# Patient Record
Sex: MALE | Race: WHITE | NOT HISPANIC OR LATINO | Employment: STUDENT | URBAN - METROPOLITAN AREA
[De-identification: names, ages, dates, MRNs, and addresses within clinical notes are randomized per-mention and may not be internally consistent; named-entity substitution may affect disease eponyms.]

---

## 2019-03-05 ENCOUNTER — OFFICE VISIT (OUTPATIENT)
Dept: URGENT CARE | Age: 29
End: 2019-03-05
Payer: COMMERCIAL

## 2019-03-05 VITALS
OXYGEN SATURATION: 97 % | SYSTOLIC BLOOD PRESSURE: 140 MMHG | HEART RATE: 80 BPM | DIASTOLIC BLOOD PRESSURE: 87 MMHG | HEIGHT: 72 IN | WEIGHT: 225 LBS | TEMPERATURE: 98 F | BODY MASS INDEX: 30.48 KG/M2 | RESPIRATION RATE: 18 BRPM

## 2019-03-05 DIAGNOSIS — J02.9 SORE THROAT: ICD-10-CM

## 2019-03-05 DIAGNOSIS — J01.40 ACUTE NON-RECURRENT PANSINUSITIS: ICD-10-CM

## 2019-03-05 DIAGNOSIS — J06.9 ACUTE UPPER RESPIRATORY INFECTION: Primary | ICD-10-CM

## 2019-03-05 LAB — S PYO AG THROAT QL: NEGATIVE

## 2019-03-05 PROCEDURE — G0382 LEV 3 HOSP TYPE B ED VISIT: HCPCS | Performed by: FAMILY MEDICINE

## 2019-03-05 PROCEDURE — 87430 STREP A AG IA: CPT | Performed by: FAMILY MEDICINE

## 2019-03-05 RX ORDER — AMOXICILLIN 500 MG/1
500 CAPSULE ORAL EVERY 8 HOURS SCHEDULED
Qty: 30 CAPSULE | Refills: 0 | Status: SHIPPED | COMMUNITY
Start: 2019-03-05 | End: 2019-03-15

## 2019-03-05 NOTE — PATIENT INSTRUCTIONS
Amoxicillin 3 times a day until finished (please take probiotics)  Cold/cough/sinus medication as needed (try Flonase nasal spray 1 spray in each nostril once or twice a day)  Tylenol, or ibuprofen (Advil/Motrin) as needed  Gargle and swish mouth with warm salt water or mouthwash  Recheck/follow-up family physician as needed  Please go to the hospital emergency department if needed

## 2019-03-05 NOTE — PROGRESS NOTES
[facility]        NAME: Zain Mathew is a 33 y o  male  : 1990    MRN: [id number]  DATE: 2019  TIME: 9:55 AM    Assessment and Plan   Acute upper respiratory infection [J06 9]  1  Acute upper respiratory infection     2  Sore throat  POCT rapid strepA   3  Acute non-recurrent pansinusitis  amoxicillin (AMOXIL) 500 mg capsule         Patient Instructions     Patient Instructions   Amoxicillin 3 times a day until finished (please take probiotics)  Cold/cough/sinus medication as needed (try Flonase nasal spray 1 spray in each nostril once or twice a day)  Tylenol, or ibuprofen (Advil/Motrin) as needed  Gargle and swish mouth with warm salt water or mouthwash  Recheck/follow-up family physician as needed  Please go to the hospital emergency department if needed  Follow up with PCP in 3-5 days  Proceed to  ER if symptoms worsen  Chief Complaint     Chief Complaint   Patient presents with    Cold Like Symptoms     c/o sore throat, head congestion, headache , feverish x saturday   denies body aches, nause ,vomiting and diarrhea , with use of dayquil and nightquiil         History of Present Illness       Fever, congestion, sinus pressure, sore throat      Review of Systems   Review of Systems   Constitutional: Positive for fever  HENT: Positive for congestion, sinus pressure and sore throat  Respiratory: Negative  Cardiovascular: Negative  Musculoskeletal: Negative  Skin: Negative  Neurological: Negative            Current Medications       Current Outpatient Medications:     amoxicillin (AMOXIL) 500 mg capsule, Take 1 capsule (500 mg total) by mouth every 8 (eight) hours for 30 doses, Disp: 30 capsule, Rfl: 0    Current Allergies     Allergies as of 2019    (No Known Allergies)            The following portions of the patient's history were reviewed and updated as appropriate: allergies, current medications, past family history, past medical history, past social history, past surgical history and problem list      Past Medical History:   Diagnosis Date    [place] teeth extracted        Past Surgical History:   Procedure Laterality Date    DENTAL SURGERY         Family History   Problem Relation Age of Onset    Cancer Mother     Cancer Father          Medications have been verified  Objective   /87   Pulse 80   Temp 98 °F (36 7 °C) (Temporal)   Resp 18   Ht 6' (1 829 m)   Wt 102 kg (225 lb)   SpO2 97%   BMI 30 52 kg/m²        Physical Exam     Physical Exam   Constitutional: He is oriented to person, place, and time  He appears well-developed and well-nourished  HENT:   Right Ear: External ear normal    Left Ear: External ear normal    Nasal congestion; sinus discomfort; injection of the oropharynx   Neck: Normal range of motion  Neck supple  Cardiovascular: Normal rate, regular rhythm and normal heart sounds  Pulmonary/Chest: Effort normal and breath sounds normal    Neurological: He is alert and oriented to person, place, and time  No nuchal rigidity   Skin: Skin is warm  Good color and turgor   Psychiatric: He has a normal mood and affect  His behavior is normal    Nursing note and vitals reviewed

## 2021-09-22 ENCOUNTER — OFFICE VISIT (OUTPATIENT)
Dept: URGENT CARE | Age: 31
End: 2021-09-22
Payer: COMMERCIAL

## 2021-09-22 VITALS — OXYGEN SATURATION: 96 % | TEMPERATURE: 96.2 F | HEART RATE: 91 BPM | RESPIRATION RATE: 18 BRPM

## 2021-09-22 DIAGNOSIS — J01.00 ACUTE NON-RECURRENT MAXILLARY SINUSITIS: Primary | ICD-10-CM

## 2021-09-22 LAB — SARS-COV-2 RNA RESP QL NAA+PROBE: NEGATIVE

## 2021-09-22 PROCEDURE — U0003 INFECTIOUS AGENT DETECTION BY NUCLEIC ACID (DNA OR RNA); SEVERE ACUTE RESPIRATORY SYNDROME CORONAVIRUS 2 (SARS-COV-2) (CORONAVIRUS DISEASE [COVID-19]), AMPLIFIED PROBE TECHNIQUE, MAKING USE OF HIGH THROUGHPUT TECHNOLOGIES AS DESCRIBED BY CMS-2020-01-R: HCPCS | Performed by: PHYSICIAN ASSISTANT

## 2021-09-22 PROCEDURE — G0382 LEV 3 HOSP TYPE B ED VISIT: HCPCS | Performed by: PHYSICIAN ASSISTANT

## 2021-09-22 PROCEDURE — U0005 INFEC AGEN DETEC AMPLI PROBE: HCPCS | Performed by: PHYSICIAN ASSISTANT

## 2021-09-22 RX ORDER — AMOXICILLIN AND CLAVULANATE POTASSIUM 875; 125 MG/1; MG/1
1 TABLET, FILM COATED ORAL EVERY 12 HOURS SCHEDULED
Qty: 20 TABLET | Refills: 0
Start: 2021-09-22 | End: 2021-10-02

## 2021-09-22 NOTE — PATIENT INSTRUCTIONS

## 2021-09-22 NOTE — PROGRESS NOTES
[facility]        NAME: Christo Leyva is a 35 y o  male  : 1990    MRN: [id number]  DATE: 2021  TIME: 8:49 AM    Assessment and Plan   Acute non-recurrent maxillary sinusitis [J01 00]  1  Acute non-recurrent maxillary sinusitis  amoxicillin-clavulanate (AUGMENTIN) 875-125 mg per tablet    Novel Coronavirus (Covid-19),PCR [facility] - Office Collection         Patient Instructions       Follow up with PCP in 3-5 days  Proceed to  ER if symptoms worsen  Chief Complaint     Chief Complaint   Patient presents with    Cough     productive , symptoms x monday    Headache     sinus pressure, post nasal drip , headache          History of Present Illness         Patient presents with sinus pain and pressure, congestion, postnasal drip and headache  This has been going on since Monday  He is vaccinated for COVID  He denies any COVID exposure but is in [facility] and [facility]      Review of Systems   Review of Systems   Constitutional: Negative  HENT: Positive for congestion, postnasal drip, sinus pressure and sinus pain  Respiratory: Negative  Cardiovascular: Negative  Gastrointestinal: Negative  Musculoskeletal: Negative  Neurological: Positive for headaches  Psychiatric/Behavioral: Negative            Current Medications       Current Outpatient Medications:     amoxicillin-clavulanate (AUGMENTIN) 875-125 mg per tablet, Take 1 tablet by mouth every 12 (twelve) hours for 10 days, Disp: 20 tablet, Rfl: 0    Current Allergies     Allergies as of 2021    (No Known Allergies)            The following portions of the patient's history were reviewed and updated as appropriate: allergies, current medications, past family history, past medical history, past social history, past surgical history and problem list      Past Medical History:   Diagnosis Date    [place] teeth extracted        Past Surgical History:   Procedure Laterality Date    DENTAL SURGERY Family History   Problem Relation Age of Onset    Cancer Mother     Cancer Father          Medications have been verified  Objective   Pulse 91   Temp (!) 96 2 °F (35 7 °C)   Resp 18   SpO2 96%        Physical Exam     Physical Exam  Vitals and nursing note reviewed  Constitutional:       General: He is not in acute distress  Appearance: Normal appearance  He is not ill-appearing, toxic-appearing or diaphoretic  HENT:      Head: Normocephalic and atraumatic  Right Ear: Tympanic membrane and ear canal normal       Left Ear: Tympanic membrane and ear canal normal       Nose: Congestion present  Comments: TTP over sinuses     Mouth/Throat:      Mouth: Mucous membranes are moist       Pharynx: No posterior oropharyngeal erythema  Cardiovascular:      Rate and Rhythm: Normal rate and regular rhythm  Pulses: Normal pulses  Pulmonary:      Effort: Pulmonary effort is normal       Breath sounds: Normal breath sounds  No wheezing  Lymphadenopathy:      Cervical: No cervical adenopathy  Skin:     General: Skin is warm and dry  Neurological:      General: No focal deficit present  Mental Status: He is alert and oriented to person, place, and time     Psychiatric:         Mood and Affect: Mood normal          Behavior: Behavior normal

## 2023-07-21 NOTE — TELEPHONE ENCOUNTER
Caller: Wife    Doctor: Hand/wrist surgeon    Reason for call: [facility] client with Lower left radial fracture. X-rays are on disk.       Call back#: [phone]

## 2024-04-30 ENCOUNTER — TELEPHONE (OUTPATIENT)
Dept: FAMILY MEDICINE CLINIC | Facility: CLINIC | Age: 34
End: 2024-04-30

## 2024-04-30 NOTE — TELEPHONE ENCOUNTER
Hi, my name's Alek Rojo. I'm have a appointment tomorrow morning at 7:40. I believe it is. I have to cancel. I wasn't sure if this was the best line to do that on or not. So you have somebody give me a call back when you get this message. I'd greatly appreciate it. My direct phone number is [phone]. Once again it's [phone]. Thanks, look forward to hearing from you. Bye.      Left message for patient to call back, appointment cancelled.